# Patient Record
Sex: MALE | Race: WHITE | NOT HISPANIC OR LATINO | URBAN - METROPOLITAN AREA
[De-identification: names, ages, dates, MRNs, and addresses within clinical notes are randomized per-mention and may not be internally consistent; named-entity substitution may affect disease eponyms.]

---

## 2024-01-22 ENCOUNTER — TELEPHONE (OUTPATIENT)
Dept: PSYCHIATRY | Facility: CLINIC | Age: 29
End: 2024-01-22

## 2024-01-22 NOTE — TELEPHONE ENCOUNTER
"Behavioral Health Outpatient Intake Questions    Referred By   : self    Please advise interviewee that they need to answer all questions truthfully to allow for best care, and any misrepresentations of information may affect their ability to be seen at this clinic   => Was this discussed? Yes     If Minor Child (under age 18)    Who is/are the legal guardian(s) of the child?     Is there a custody agreement? No     If \"YES\"- Custody orders must be obtained prior to scheduling the first appointment  In addition, Consent to Treatment must be signed by all legal guardians prior to scheduling the first appointment    If \"NO\"- Consent to Treatment must be signed by all legal guardians prior to scheduling the first appointment    Behavioral Health Outpatient Intake History -     Presenting Problem (in patient's own words): ADHD, ODD, OCD, Bipolar  Patient is currently staying with Cousin in [place], but is technically homeless    Are there any communication barriers for this patient?     Yes                                               If yes, please describe barriers: ADHD  If there is a unique situation, please refer to Cash Hameed/Irma Crouch for final determination.    Are you taking any psychiatric medications? No     If \"YES\" -What are they  No currently but would like to restart      If \"YES\" -Who prescribes?     Has the Patient previously received outpatient Talk Therapy or Medication Management from [facility]  No        If \"YES\"- When, Where and with Whom?         If \"NO\" -Has Patient received these services elsewhere?       If \"YES\" -When, Where, and with Whom? [facility] and [facility]     Has the Patient abused alcohol or other substances in the last 6 months ? No  No concerns of substance abuse are reported.     If \"YES\" -What substance, How much, How often?     If illegal substance: Refer to [facility] (for ADEEL) or [facility].   If Alcohol in excess of 10 drinks per week:  Refer " "to [facility] (for ADEEL) or [facility]    Legal History-     Is this treatment court ordered? No   If \"yes \"send to :  Talk Therapy : Send to Cash Hameed/Irma Crouch for final determination   Med Management: Send to Dr Penny for final determination     Has the Patient been convicted of a felony?  No   If \"Yes\" send to -When, What?  Talk Therapy : Send to Cash Crouch for final determination   Med Management: Send to Dr Penny for final determination     ACCEPTED as a patient Yes  If \"Yes\" Appointment Date: 2/2/2024 in person at 10:00 with Susana Lugo    Referred Elsewhere? No  If “Yes” - (Where? Ex: [facility], [facility], [facility], [facility], etc.)       Name of Insurance Co: [employer]  Insurance ID#   Insurance Phone #  If ins is primary or secondary?  If patient is a minor, parents information such as Name, D.O.B of guarantor.  "

## 2024-01-22 NOTE — TELEPHONE ENCOUNTER
Patient called to inquire about MHOP therapy and psychiatry services. Patient is homeless currently staying with cousin in [place]. Applied for MA   Patient was added to wait list for both services. Writer also transferred caller to Case Management.

## 2024-01-23 ENCOUNTER — TELEPHONE (OUTPATIENT)
Dept: PSYCHIATRY | Facility: CLINIC | Age: 29
End: 2024-01-23

## 2024-02-02 ENCOUNTER — OFFICE VISIT (OUTPATIENT)
Dept: BEHAVIORAL/MENTAL HEALTH CLINIC | Facility: CLINIC | Age: 29
End: 2024-02-02

## 2024-02-02 DIAGNOSIS — F43.20 ADJUSTMENT DISORDER, UNSPECIFIED TYPE: Primary | ICD-10-CM

## 2024-02-02 NOTE — PSYCH
No Call. No Show. No Charge    Anirudh Arrieta no showed 02/02/24 appointment , staff called and left message to reschedule appointment     Treatment Plan not due at this session.     This note was not shared with the patient due to this is a psychotherapy note